# Patient Record
Sex: FEMALE | URBAN - NONMETROPOLITAN AREA
[De-identification: names, ages, dates, MRNs, and addresses within clinical notes are randomized per-mention and may not be internally consistent; named-entity substitution may affect disease eponyms.]

---

## 2022-08-01 ENCOUNTER — APPOINTMENT (RX ONLY)
Dept: URBAN - NONMETROPOLITAN AREA CLINIC 8 | Facility: CLINIC | Age: 62
Setting detail: DERMATOLOGY
End: 2022-08-01

## 2022-08-01 DIAGNOSIS — L57.0 ACTINIC KERATOSIS: ICD-10-CM | Status: INADEQUATELY CONTROLLED

## 2022-08-01 DIAGNOSIS — L28.0 LICHEN SIMPLEX CHRONICUS: ICD-10-CM | Status: INADEQUATELY CONTROLLED

## 2022-08-01 DIAGNOSIS — L81.4 OTHER MELANIN HYPERPIGMENTATION: ICD-10-CM

## 2022-08-01 PROCEDURE — ? COUNSELING

## 2022-08-01 PROCEDURE — 99214 OFFICE O/P EST MOD 30 MIN: CPT

## 2022-08-01 PROCEDURE — ? PRESCRIPTION

## 2022-08-01 RX ORDER — HYDROQUINONE 40 MG/G
APPLY CREAM TOPICAL QHS
Qty: 28.35 | Refills: 9 | Status: ERX | COMMUNITY
Start: 2022-08-01

## 2022-08-01 RX ORDER — TRETIONIN 0.25 MG/G
APPLY CREAM TOPICAL QHS
Qty: 20 | Refills: 9 | Status: ERX | COMMUNITY
Start: 2022-08-01

## 2022-08-01 RX ORDER — TRIAMCINOLONE ACETONIDE 1 MG/G
APPLY OINTMENT TOPICAL BID
Qty: 30 | Refills: 3 | Status: ERX | COMMUNITY
Start: 2022-08-01

## 2022-08-01 RX ORDER — FLUOROURACIL 5 MG/G
APPLY CREAM TOPICAL QHS
Qty: 40 | Refills: 0 | Status: ERX | COMMUNITY
Start: 2022-08-01

## 2022-08-01 RX ADMIN — HYDROQUINONE APPLY: 40 CREAM TOPICAL at 00:00

## 2022-08-01 RX ADMIN — TRETIONIN APPLY: 0.25 CREAM TOPICAL at 00:00

## 2022-08-01 RX ADMIN — TRIAMCINOLONE ACETONIDE APPLY: 1 OINTMENT TOPICAL at 00:00

## 2022-08-01 RX ADMIN — FLUOROURACIL APPLY: 5 CREAM TOPICAL at 00:00

## 2022-08-01 ASSESSMENT — LOCATION ZONE DERM
LOCATION ZONE: FACE
LOCATION ZONE: FEET

## 2022-08-01 ASSESSMENT — LOCATION SIMPLE DESCRIPTION DERM
LOCATION SIMPLE: RIGHT FOREHEAD
LOCATION SIMPLE: LEFT FOREHEAD
LOCATION SIMPLE: LEFT EYEBROW
LOCATION SIMPLE: LEFT PLANTAR SURFACE

## 2022-08-01 ASSESSMENT — LOCATION DETAILED DESCRIPTION DERM
LOCATION DETAILED: LEFT INSTEP
LOCATION DETAILED: RIGHT INFERIOR FOREHEAD
LOCATION DETAILED: LEFT LATERAL EYEBROW
LOCATION DETAILED: LEFT INFERIOR FOREHEAD

## 2022-08-01 NOTE — HPI: EVALUATION OF SKIN LESION(S)
Hpi Title: Evaluation of Skin Lesions
What Type Of Note Output Would You Prefer (Optional)?: Bullet Format
Additional History: The spot within her left eyebrow